# Patient Record
Sex: FEMALE | Race: WHITE | NOT HISPANIC OR LATINO | URBAN - METROPOLITAN AREA
[De-identification: names, ages, dates, MRNs, and addresses within clinical notes are randomized per-mention and may not be internally consistent; named-entity substitution may affect disease eponyms.]

---

## 2024-02-27 ENCOUNTER — OFFICE VISIT (OUTPATIENT)
Dept: INTERNAL MEDICINE | Facility: CLINIC | Age: 39
End: 2024-02-27
Payer: COMMERCIAL

## 2024-02-27 ENCOUNTER — LAB VISIT (OUTPATIENT)
Dept: LAB | Facility: HOSPITAL | Age: 39
End: 2024-02-27
Payer: COMMERCIAL

## 2024-02-27 VITALS
DIASTOLIC BLOOD PRESSURE: 84 MMHG | OXYGEN SATURATION: 99 % | SYSTOLIC BLOOD PRESSURE: 126 MMHG | HEART RATE: 62 BPM | BODY MASS INDEX: 43.4 KG/M2 | WEIGHT: 293 LBS | HEIGHT: 69 IN

## 2024-02-27 DIAGNOSIS — Z00.00 ENCOUNTER FOR HEALTH MAINTENANCE EXAMINATION: Primary | ICD-10-CM

## 2024-02-27 DIAGNOSIS — Z13.220 SCREENING CHOLESTEROL LEVEL: ICD-10-CM

## 2024-02-27 DIAGNOSIS — M25.562 ACUTE PAIN OF LEFT KNEE: ICD-10-CM

## 2024-02-27 DIAGNOSIS — E66.01 MORBIDLY OBESE: ICD-10-CM

## 2024-02-27 DIAGNOSIS — Z13.1 DIABETES MELLITUS SCREENING: ICD-10-CM

## 2024-02-27 DIAGNOSIS — Z11.4 ENCOUNTER FOR SCREENING FOR HIV: ICD-10-CM

## 2024-02-27 DIAGNOSIS — Z11.59 ENCOUNTER FOR HEPATITIS C SCREENING TEST FOR LOW RISK PATIENT: ICD-10-CM

## 2024-02-27 DIAGNOSIS — Z01.419 PAP TEST, AS PART OF ROUTINE GYNECOLOGICAL EXAMINATION: ICD-10-CM

## 2024-02-27 DIAGNOSIS — Z23 NEED FOR TDAP VACCINATION: ICD-10-CM

## 2024-02-27 DIAGNOSIS — Z01.89 ROUTINE LAB DRAW: ICD-10-CM

## 2024-02-27 DIAGNOSIS — Z23 NEED FOR INFLUENZA VACCINATION: ICD-10-CM

## 2024-02-27 DIAGNOSIS — Z12.31 ENCOUNTER FOR SCREENING MAMMOGRAM FOR MALIGNANT NEOPLASM OF BREAST: ICD-10-CM

## 2024-02-27 DIAGNOSIS — I83.813 VARICOSE VEINS OF BOTH LOWER EXTREMITIES WITH PAIN: ICD-10-CM

## 2024-02-27 PROBLEM — I83.93 VARICOSE VEINS OF BOTH LOWER EXTREMITIES: Status: ACTIVE | Noted: 2024-02-27

## 2024-02-27 LAB
ALBUMIN SERPL BCP-MCNC: 3.9 G/DL (ref 3.5–5.2)
ALP SERPL-CCNC: 54 U/L (ref 55–135)
ALT SERPL W/O P-5'-P-CCNC: 17 U/L (ref 10–44)
ANION GAP SERPL CALC-SCNC: 12 MMOL/L (ref 8–16)
AST SERPL-CCNC: 16 U/L (ref 10–40)
BASOPHILS # BLD AUTO: 0.04 K/UL (ref 0–0.2)
BASOPHILS NFR BLD: 0.6 % (ref 0–1.9)
BILIRUB SERPL-MCNC: 0.4 MG/DL (ref 0.1–1)
BUN SERPL-MCNC: 8 MG/DL (ref 6–20)
CALCIUM SERPL-MCNC: 9.5 MG/DL (ref 8.7–10.5)
CHLORIDE SERPL-SCNC: 105 MMOL/L (ref 95–110)
CHOLEST SERPL-MCNC: 184 MG/DL (ref 120–199)
CHOLEST/HDLC SERPL: 3 {RATIO} (ref 2–5)
CO2 SERPL-SCNC: 21 MMOL/L (ref 23–29)
CREAT SERPL-MCNC: 0.7 MG/DL (ref 0.5–1.4)
DIFFERENTIAL METHOD BLD: NORMAL
EOSINOPHIL # BLD AUTO: 0.1 K/UL (ref 0–0.5)
EOSINOPHIL NFR BLD: 0.8 % (ref 0–8)
ERYTHROCYTE [DISTWIDTH] IN BLOOD BY AUTOMATED COUNT: 12.7 % (ref 11.5–14.5)
EST. GFR  (NO RACE VARIABLE): >60 ML/MIN/1.73 M^2
ESTIMATED AVG GLUCOSE: 108 MG/DL (ref 68–131)
GLUCOSE SERPL-MCNC: 85 MG/DL (ref 70–110)
HBA1C MFR BLD: 5.4 % (ref 4–5.6)
HCT VFR BLD AUTO: 39.5 % (ref 37–48.5)
HCV AB SERPL QL IA: NORMAL
HDLC SERPL-MCNC: 62 MG/DL (ref 40–75)
HDLC SERPL: 33.7 % (ref 20–50)
HGB BLD-MCNC: 13 G/DL (ref 12–16)
HIV 1+2 AB+HIV1 P24 AG SERPL QL IA: NORMAL
IMM GRANULOCYTES # BLD AUTO: 0.02 K/UL (ref 0–0.04)
IMM GRANULOCYTES NFR BLD AUTO: 0.3 % (ref 0–0.5)
LDLC SERPL CALC-MCNC: 109.4 MG/DL (ref 63–159)
LYMPHOCYTES # BLD AUTO: 1.6 K/UL (ref 1–4.8)
LYMPHOCYTES NFR BLD: 25.2 % (ref 18–48)
MCH RBC QN AUTO: 30 PG (ref 27–31)
MCHC RBC AUTO-ENTMCNC: 32.9 G/DL (ref 32–36)
MCV RBC AUTO: 91 FL (ref 82–98)
MONOCYTES # BLD AUTO: 0.5 K/UL (ref 0.3–1)
MONOCYTES NFR BLD: 8.4 % (ref 4–15)
NEUTROPHILS # BLD AUTO: 4.1 K/UL (ref 1.8–7.7)
NEUTROPHILS NFR BLD: 64.7 % (ref 38–73)
NONHDLC SERPL-MCNC: 122 MG/DL
NRBC BLD-RTO: 0 /100 WBC
PLATELET # BLD AUTO: 295 K/UL (ref 150–450)
PMV BLD AUTO: 10.3 FL (ref 9.2–12.9)
POTASSIUM SERPL-SCNC: 4.6 MMOL/L (ref 3.5–5.1)
PROT SERPL-MCNC: 8 G/DL (ref 6–8.4)
RBC # BLD AUTO: 4.33 M/UL (ref 4–5.4)
SODIUM SERPL-SCNC: 138 MMOL/L (ref 136–145)
TRIGL SERPL-MCNC: 63 MG/DL (ref 30–150)
TSH SERPL DL<=0.005 MIU/L-ACNC: 1.32 UIU/ML (ref 0.4–4)
WBC # BLD AUTO: 6.31 K/UL (ref 3.9–12.7)

## 2024-02-27 PROCEDURE — 80061 LIPID PANEL: CPT | Performed by: NURSE PRACTITIONER

## 2024-02-27 PROCEDURE — 87389 HIV-1 AG W/HIV-1&-2 AB AG IA: CPT | Performed by: NURSE PRACTITIONER

## 2024-02-27 PROCEDURE — 83036 HEMOGLOBIN GLYCOSYLATED A1C: CPT | Performed by: NURSE PRACTITIONER

## 2024-02-27 PROCEDURE — 86803 HEPATITIS C AB TEST: CPT | Performed by: NURSE PRACTITIONER

## 2024-02-27 PROCEDURE — 3008F BODY MASS INDEX DOCD: CPT | Mod: CPTII,S$GLB,, | Performed by: NURSE PRACTITIONER

## 2024-02-27 PROCEDURE — 1159F MED LIST DOCD IN RCRD: CPT | Mod: CPTII,S$GLB,, | Performed by: NURSE PRACTITIONER

## 2024-02-27 PROCEDURE — 80053 COMPREHEN METABOLIC PANEL: CPT | Performed by: NURSE PRACTITIONER

## 2024-02-27 PROCEDURE — 84443 ASSAY THYROID STIM HORMONE: CPT | Performed by: NURSE PRACTITIONER

## 2024-02-27 PROCEDURE — 3079F DIAST BP 80-89 MM HG: CPT | Mod: CPTII,S$GLB,, | Performed by: NURSE PRACTITIONER

## 2024-02-27 PROCEDURE — 99999 PR PBB SHADOW E&M-NEW PATIENT-LVL IV: CPT | Mod: PBBFAC,,, | Performed by: NURSE PRACTITIONER

## 2024-02-27 PROCEDURE — 3074F SYST BP LT 130 MM HG: CPT | Mod: CPTII,S$GLB,, | Performed by: NURSE PRACTITIONER

## 2024-02-27 PROCEDURE — 36415 COLL VENOUS BLD VENIPUNCTURE: CPT | Performed by: NURSE PRACTITIONER

## 2024-02-27 PROCEDURE — 85025 COMPLETE CBC W/AUTO DIFF WBC: CPT | Performed by: NURSE PRACTITIONER

## 2024-02-27 PROCEDURE — 99386 PREV VISIT NEW AGE 40-64: CPT | Mod: S$GLB,,, | Performed by: NURSE PRACTITIONER

## 2024-02-27 NOTE — PROGRESS NOTES
"Subjective     Patient ID: Carisa Cooley is a 38 y.o. female.    Chief Complaint: Annual Exam and Knee Pain (Left )    Pt new to me, here for, "Looking to get blood work done. I do not take any medication so want to make sure I am not pre diabetic."    No previous PCP    Has a hx of varicose veins, has been having left knee pain since January. She is seeing Dr. Louis on 3-5-24 as she thinks this is the cause. She does stand a lot at work. Takes 2 Ibuprofen in the AM for it with some relief.    I have reviewed the HPI/ROS info pt entered in portal prior to visit today below           Review of Systems   Constitutional:  Negative for activity change, appetite change and unexpected weight change.   HENT:  Negative for dental problem, hearing loss and trouble swallowing.    Eyes:  Negative for discharge and visual disturbance.   Respiratory:  Negative for apnea, cough, chest tightness, shortness of breath and wheezing.    Cardiovascular:  Negative for chest pain, palpitations and leg swelling.   Gastrointestinal:  Negative for abdominal distention, abdominal pain, anal bleeding, blood in stool, constipation, diarrhea, nausea, rectal pain and vomiting.   Endocrine: Negative for cold intolerance, heat intolerance, polydipsia, polyphagia and polyuria.   Genitourinary:  Negative for difficulty urinating, hematuria, menstrual problem, pelvic pain and vaginal pain.   Musculoskeletal:  Positive for arthralgias and leg pain. Negative for myalgias.        As documented in HPI     Integumentary:  Negative for color change.   Allergic/Immunologic: Negative for environmental allergies, food allergies and immunocompromised state.   Neurological:  Negative for dizziness, speech difficulty, weakness, light-headedness, numbness and headaches.   Hematological:  Negative for adenopathy. Does not bruise/bleed easily.   Psychiatric/Behavioral:  Negative for agitation, behavioral problems, dysphoric mood, sleep disturbance and " suicidal ideas.        Review of patient's allergies indicates:  No Known Allergies    No current outpatient medications on file.    Patient Active Problem List   Diagnosis    Varicose veins of both lower extremities       History reviewed. No pertinent past medical history.    History reviewed. No pertinent surgical history.    Social History     Socioeconomic History    Marital status: Single   Tobacco Use    Smoking status: Never    Smokeless tobacco: Never   Substance and Sexual Activity    Alcohol use: Yes     Alcohol/week: 10.0 standard drinks of alcohol     Types: 10 Glasses of wine per week    Drug use: Never    Sexual activity: Not Currently     Partners: Male     Birth control/protection: Condom     Social Determinants of Health     Financial Resource Strain: Low Risk  (2/26/2024)    Overall Financial Resource Strain (CARDIA)     Difficulty of Paying Living Expenses: Not very hard   Food Insecurity: No Food Insecurity (2/26/2024)    Hunger Vital Sign     Worried About Running Out of Food in the Last Year: Never true     Ran Out of Food in the Last Year: Never true   Transportation Needs: No Transportation Needs (2/26/2024)    PRAPARE - Transportation     Lack of Transportation (Medical): No     Lack of Transportation (Non-Medical): No   Physical Activity: Insufficiently Active (2/26/2024)    Exercise Vital Sign     Days of Exercise per Week: 2 days     Minutes of Exercise per Session: 30 min   Stress: Stress Concern Present (2/26/2024)    [employer] [place] [employer] - Occupational Stress Questionnaire     Feeling of Stress : To some extent   Social Connections: Unknown (2/26/2024)    Social Connection and Isolation Panel [NHANES]     Frequency of Communication with Friends and Family: Twice a week     Frequency of Social Gatherings with Friends and Family: More than three times a week     Active Member of Clubs or Organizations: No     Attends Club or Organization Meetings: Never     Marital  "Status: Never    Housing Stability: Low Risk  (2/26/2024)    Housing Stability Vital Sign     Unable to Pay for Housing in the Last Year: No     Number of Places Lived in the Last Year: 2     Unstable Housing in the Last Year: No       Family History   Problem Relation Age of Onset    Arthritis Mother     Kidney disease Mother     Diabetes Father     Hypertension Father     Diabetes Paternal Aunt     Diabetes Paternal Uncle     Drug abuse Paternal Uncle        Objective     Vitals:    02/27/24 1105   BP: 126/84   Pulse: 62   SpO2: 99%   Weight: 135.9 kg (299 lb 7.9 oz)   Height: 5' 9" (1.753 m)   PainSc:   4     Body mass index is 44.23 kg/m².    Physical Exam  Vitals and nursing note reviewed.   Constitutional:       Appearance: Normal appearance. She is well-developed. She is obese.   HENT:      Head: Normocephalic.      Right Ear: Hearing, tympanic membrane, ear canal and external ear normal. There is no impacted cerumen.      Left Ear: Hearing, tympanic membrane, ear canal and external ear normal. There is no impacted cerumen.      Nose: Nose normal.      Mouth/Throat:      Mouth: Mucous membranes are moist.      Pharynx: Oropharynx is clear.   Eyes:      General: Lids are normal. Lids are everted, no foreign bodies appreciated.      Extraocular Movements: Extraocular movements intact.      Conjunctiva/sclera: Conjunctivae normal.      Pupils: Pupils are equal, round, and reactive to light.   Neck:      Vascular: No carotid bruit or JVD.      Trachea: Trachea normal.   Cardiovascular:      Rate and Rhythm: Normal rate and regular rhythm.      Pulses: Normal pulses.      Heart sounds: Normal heart sounds, S1 normal and S2 normal.   Pulmonary:      Effort: Pulmonary effort is normal.      Breath sounds: Normal breath sounds.   Abdominal:      General: Abdomen is flat. Bowel sounds are normal.      Palpations: Abdomen is soft.   Musculoskeletal:         General: Normal range of motion.      Cervical back: " Full passive range of motion without pain, normal range of motion and neck supple.   Skin:     General: Skin is warm and dry.      Capillary Refill: Capillary refill takes less than 2 seconds.   Neurological:      General: No focal deficit present.      Mental Status: She is alert and oriented to person, place, and time.      Deep Tendon Reflexes: Reflexes are normal and symmetric.   Psychiatric:         Mood and Affect: Mood normal.         Speech: Speech normal.         Behavior: Behavior normal.         Thought Content: Thought content normal.         Judgment: Judgment normal.            Assessment and Plan     1. Encounter for health maintenance examination  Annual wellness exam completed.    All medications, histories, and concerns reviewed, reconciled, and addressed.    Appropriate Screenings per pt's sex and age have been reviewed and discussed with pt.    BMI reviewed.    2. Routine lab draw  -     CBC Auto Differential; Future; Expected date: 02/27/2024  -     Comprehensive Metabolic Panel; Future; Expected date: 02/27/2024  -     Lipid Panel; Future; Expected date: 02/27/2024  -     TSH; Future; Expected date: 02/27/2024  -     Hemoglobin A1C; Future; Expected date: 02/27/2024    3. Screening cholesterol level  -     Lipid Panel; Future; Expected date: 02/27/2024    4. Diabetes mellitus screening  -     Hemoglobin A1C; Future; Expected date: 02/27/2024    5. BMI 40.0-44.9, adult  BMI reviewed    6. Morbidly obese  BMI reviewed.    Diet and exercise to lose weight.    7. Encounter for screening mammogram for malignant neoplasm of breast  -     Mammo Digital Screening Bilat; Future; Expected date: 02/27/2024    8. Encounter for screening for HIV  -     HIV 1/2 Ag/Ab (4th Gen); Future; Expected date: 02/27/2024    9. Pap test, as part of routine gynecological examination  -     Ambulatory referral/consult to Gynecology; Future; Expected date: 02/27/2024    10. Need for influenza vaccination  Declined    11.  Need for Tdap vaccination  Declined    12. Encounter for hepatitis C screening test for low risk patient  -     Hepatitis C Antibody; Future; Expected date: 02/27/2024    13. Varicose veins of both lower extremities with pain  -     US Lower Extremity Veins Left; Future; Expected date: 02/27/2024    14. Acute pain of left knee  -     US Lower Extremity Veins Left; Future; Expected date: 02/27/2024    Fasting lab orders, will call with results, if results ok, RTC in 1 yr for annual or sooner prn with one of MDs I work with who can be your new PCP: Dr. Amparo Walter, Dr. Prince Tavarez, Dr. Syd Collins, Dr.Melvin Post, Dr. Abbe Leblanc    GYN referral for pap smear    Mammogram ordered    Check left leg ultrasound due to left knee pain, keep follow up on 3-5-24 with Dr. Louis vascular as scheduled. Avoid high salt intake and elevate extremities.    Follow up in about 1 year (around 2/27/2025) for annual or sooner.

## 2024-02-27 NOTE — PATIENT INSTRUCTIONS
Fasting lab orders, will call with results, if results ok, RTC in 1 yr for annual or sooner prn with one of MDs I work with who can be your new PCP: Dr. Amparo Walter, Dr. Prince Tavarez, Dr. Syd Collins, Dr.Melvin Post, Dr. Abbe Leblanc    GYN referral for pap smear    Mammogram ordered    Check left leg ultrasound due to left knee pain, keep follow up on 3-5-24 with Dr. Missy hernandez as scheduled. Avoid high salt intake and elevate extremities.    [facility] will contact you to fill out a survey about your experience today with me as a provider during your visit. Please fill it out as it helps me to improve patient care and patient experience within my practice. If you had a great experience with me as a provider, please indicate that on the survey particularly on the question of would you recommend me to others. My goal is to score 5. Thanks.

## 2024-02-28 ENCOUNTER — TELEPHONE (OUTPATIENT)
Dept: VASCULAR SURGERY | Facility: CLINIC | Age: 39
End: 2024-02-28
Payer: COMMERCIAL

## 2024-02-28 NOTE — TELEPHONE ENCOUNTER
Attempted to contact patient in reference to appt with Dr. Louis for varicose veins. Voice message left for pt explaining that appt with Dr. Louis will be cancelled as Dr. Louis does not see patients for this problem and that pt must have referral for varicose veins before appt can be scheduled with any vascular surgeon within department. Return call requested for questions or concerns. Appt cancelled.

## 2024-02-29 ENCOUNTER — HOSPITAL ENCOUNTER (OUTPATIENT)
Dept: RADIOLOGY | Facility: OTHER | Age: 39
Discharge: HOME | End: 2024-02-29
Attending: NURSE PRACTITIONER
Payer: COMMERCIAL

## 2024-02-29 DIAGNOSIS — M25.562 ACUTE PAIN OF LEFT KNEE: ICD-10-CM

## 2024-02-29 DIAGNOSIS — I83.813 VARICOSE VEINS OF BOTH LOWER EXTREMITIES WITH PAIN: ICD-10-CM

## 2024-02-29 PROCEDURE — 93971 EXTREMITY STUDY: CPT | Mod: TC,LT

## 2024-02-29 PROCEDURE — 93971 EXTREMITY STUDY: CPT | Mod: 26,LT,, | Performed by: RADIOLOGY
